# Patient Record
Sex: MALE | Race: WHITE
[De-identification: names, ages, dates, MRNs, and addresses within clinical notes are randomized per-mention and may not be internally consistent; named-entity substitution may affect disease eponyms.]

---

## 2020-09-23 ENCOUNTER — HOSPITAL ENCOUNTER (OUTPATIENT)
Dept: HOSPITAL 92 - LABBT | Age: 80
Discharge: HOME | End: 2020-09-23
Attending: UROLOGY
Payer: MEDICARE

## 2020-09-23 DIAGNOSIS — Z20.828: ICD-10-CM

## 2020-09-23 DIAGNOSIS — C67.9: ICD-10-CM

## 2020-09-23 DIAGNOSIS — Z01.812: Primary | ICD-10-CM

## 2020-09-23 LAB
ANION GAP SERPL CALC-SCNC: 14 MMOL/L (ref 10–20)
APTT PPP: 27 SEC (ref 22.9–36.1)
BUN SERPL-MCNC: 21 MG/DL (ref 8.4–25.7)
CALCIUM SERPL-MCNC: 9.1 MG/DL (ref 7.8–10.44)
CHLORIDE SERPL-SCNC: 105 MMOL/L (ref 98–107)
CO2 SERPL-SCNC: 23 MMOL/L (ref 23–31)
CREAT CL PREDICTED SERPL C-G-VRATE: 0 ML/MIN (ref 70–130)
GLUCOSE SERPL-MCNC: 97 MG/DL (ref 83–110)
HGB BLD-MCNC: 14.3 G/DL (ref 14–18)
INR PPP: 0.9
MCH RBC QN AUTO: 32.4 PG (ref 27–31)
MCV RBC AUTO: 97.3 FL (ref 78–98)
PLATELET # BLD AUTO: 171 THOU/UL (ref 130–400)
POTASSIUM SERPL-SCNC: 4.5 MMOL/L (ref 3.5–5.1)
PROTHROMBIN TIME: 12.6 SEC (ref 12–14.7)
RBC # BLD AUTO: 4.41 MILL/UL (ref 4.7–6.1)
SODIUM SERPL-SCNC: 137 MMOL/L (ref 136–145)
WBC # BLD AUTO: 5.9 THOU/UL (ref 4.8–10.8)

## 2020-09-23 PROCEDURE — 87635 SARS-COV-2 COVID-19 AMP PRB: CPT

## 2020-09-23 PROCEDURE — U0003 INFECTIOUS AGENT DETECTION BY NUCLEIC ACID (DNA OR RNA); SEVERE ACUTE RESPIRATORY SYNDROME CORONAVIRUS 2 (SARS-COV-2) (CORONAVIRUS DISEASE [COVID-19]), AMPLIFIED PROBE TECHNIQUE, MAKING USE OF HIGH THROUGHPUT TECHNOLOGIES AS DESCRIBED BY CMS-2020-01-R: HCPCS

## 2020-09-23 PROCEDURE — 85730 THROMBOPLASTIN TIME PARTIAL: CPT

## 2020-09-23 PROCEDURE — 85027 COMPLETE CBC AUTOMATED: CPT

## 2020-09-23 PROCEDURE — 85610 PROTHROMBIN TIME: CPT

## 2020-09-23 PROCEDURE — 80048 BASIC METABOLIC PNL TOTAL CA: CPT

## 2020-09-28 ENCOUNTER — HOSPITAL ENCOUNTER (OUTPATIENT)
Dept: HOSPITAL 92 - SDC | Age: 80
Discharge: HOME | End: 2020-09-28
Attending: UROLOGY
Payer: MEDICARE

## 2020-09-28 VITALS — BODY MASS INDEX: 24.3 KG/M2

## 2020-09-28 DIAGNOSIS — D41.4: Primary | ICD-10-CM

## 2020-09-28 DIAGNOSIS — Z88.5: ICD-10-CM

## 2020-09-28 DIAGNOSIS — I10: ICD-10-CM

## 2020-09-28 DIAGNOSIS — Z79.899: ICD-10-CM

## 2020-09-28 DIAGNOSIS — Z91.041: ICD-10-CM

## 2020-09-28 PROCEDURE — 88313 SPECIAL STAINS GROUP 2: CPT

## 2020-09-28 PROCEDURE — 88341 IMHCHEM/IMCYTCHM EA ADD ANTB: CPT

## 2020-09-28 PROCEDURE — 88342 IMHCHEM/IMCYTCHM 1ST ANTB: CPT

## 2020-09-28 PROCEDURE — 88307 TISSUE EXAM BY PATHOLOGIST: CPT

## 2020-09-28 PROCEDURE — 0TBB8ZX EXCISION OF BLADDER, VIA NATURAL OR ARTIFICIAL OPENING ENDOSCOPIC, DIAGNOSTIC: ICD-10-PCS | Performed by: UROLOGY

## 2020-09-28 PROCEDURE — 74420 UROGRAPHY RTRGR +-KUB: CPT

## 2020-09-28 PROCEDURE — BT14ZZZ FLUOROSCOPY OF KIDNEYS, URETERS AND BLADDER: ICD-10-PCS | Performed by: UROLOGY

## 2020-09-28 NOTE — OP
DATE OF PROCEDURE:  09/28/2020



PREOPERATIVE DIAGNOSIS:  Bladder tumors.



POSTOPERATIVE DIAGNOSIS:  Bladder tumors.



PROCEDURES PERFORMED:  

1. Cystoscopy.

2. Bilateral retrograde.

3. Transurethral resection of bladder tumors.



SPECIMENS REMOVED:  Bladder tumors.



ESTIMATED BLOOD LOSS:  Minimal.



FINDINGS:  The floor well away from the trigone.  There was 1.5 cm patch of small

papillary tumors group next to each other.  They were taken together in 2 swipes.

Retrograde studies were done that were normal. 



DESCRIPTION OF PROCEDURE:  After obtaining written and verbal consent from the

patient, he was taken to the operating suite.  He was placed in supine position on

the treatment table.  PlexiPulses were placed in his lower extremities and turned

on.  He had received some IV antibiotics as well as some IV Solu-Cortef.  He was

given a general anesthetic and oral obturator intubation and he was placed in the

dorsal lithotomy position, sterilely prepped and draped.  Cystoscopy was performed

with a 22-[place] sheath.  This was well lubricated, passed under direct vision

through the male urethra into the urinary bladder with aid of a 30-degree lens and

video camera and monitor.  The bladder was filled and emptied a number of times as

it was examined with 30 and 70 degree lens.  Using a 30-degree lens, we brought in a

cone-tipped catheter, flushed with contrast.   KUB was taken and we did a right

retrograde followed by left retrograde.  This was done by injecting about 12 mL of

contrast in a retrograde manner through the cone-tipped catheter up the right

ureter, filling out the ureter and the collecting system.  The same on the left

side.  Drainage films were done.  At this point, the Gyrus instruments were brought

in with [employer] resectoscope and the Gyrus generator.  A 30-degree lens and a

visual obturator were used to pass the resectoscope sheath into the bladder and then

the Gyrus was hooked up.  We went ahead and resected the small patch of tumors.

They were all just very looked like little early papillary tumors.  We cauterized

the edges in the base of these tumors.  This was sent off with 2 swipes.  The

bladder was drained.  The instruments were removed.  Sweet catheter was sterilely

inserted and hooked up to a leg bag.  He at this point was awakened and extubated

and taken by pollo to recovery room. 







Job ID:  [id number]

## 2020-09-28 NOTE — RAD
EXAM: Retrograde IVP



HISTORY: Bladder tumor



COMPARISON: None



FINDINGS/IMPRESSION: Limited intraoperative fluoroscopic views of the retrograde IVP were submitted f
or interpretation. There is no evidence of hydronephrosis. No obvious filling defects are seen.



Reported By: Prabhakar Peñaloza 

Electronically Signed:  9/28/2020 9:44 AM

## 2020-10-01 ENCOUNTER — HOSPITAL ENCOUNTER (EMERGENCY)
Dept: HOSPITAL 92 - ERS | Age: 80
Discharge: HOME | End: 2020-10-01
Payer: MEDICARE

## 2020-10-01 DIAGNOSIS — I10: ICD-10-CM

## 2020-10-01 DIAGNOSIS — R10.9: Primary | ICD-10-CM

## 2020-10-01 DIAGNOSIS — Z79.899: ICD-10-CM

## 2020-10-01 LAB
ALBUMIN SERPL BCG-MCNC: 4.6 G/DL (ref 3.4–4.8)
ALP SERPL-CCNC: 54 U/L (ref 40–110)
ALT SERPL W P-5'-P-CCNC: 26 U/L (ref 8–55)
ANION GAP SERPL CALC-SCNC: 18 MMOL/L (ref 10–20)
AST SERPL-CCNC: 28 U/L (ref 5–34)
BACTERIA UR QL AUTO: (no result) HPF
BASOPHILS # BLD AUTO: 0 THOU/UL (ref 0–0.2)
BASOPHILS NFR BLD AUTO: 0 % (ref 0–1)
BILIRUB SERPL-MCNC: 0.9 MG/DL (ref 0.2–1.2)
BUN SERPL-MCNC: 20 MG/DL (ref 8.4–25.7)
CALCIUM SERPL-MCNC: 9.9 MG/DL (ref 7.8–10.44)
CHLORIDE SERPL-SCNC: 101 MMOL/L (ref 98–107)
CO2 SERPL-SCNC: 24 MMOL/L (ref 23–31)
CREAT CL PREDICTED SERPL C-G-VRATE: 0 ML/MIN (ref 70–130)
EOSINOPHIL # BLD AUTO: 0 THOU/UL (ref 0–0.7)
EOSINOPHIL NFR BLD AUTO: 0.2 % (ref 0–10)
GLOBULIN SER CALC-MCNC: 3.2 G/DL (ref 2.4–3.5)
GLUCOSE SERPL-MCNC: 138 MG/DL (ref 83–110)
HGB BLD-MCNC: 16.2 G/DL (ref 14–18)
LEUKOCYTE ESTERASE UR QL STRIP.AUTO: 500 LEU/UL
LYMPHOCYTES # BLD: 0.7 THOU/UL (ref 1.2–3.4)
LYMPHOCYTES NFR BLD AUTO: 5 % (ref 21–51)
MCH RBC QN AUTO: 32.3 PG (ref 27–31)
MCV RBC AUTO: 96.4 FL (ref 78–98)
MONOCYTES # BLD AUTO: 0.6 THOU/UL (ref 0.11–0.59)
MONOCYTES NFR BLD AUTO: 4.5 % (ref 0–10)
NEUTROPHILS # BLD AUTO: 12.3 THOU/UL (ref 1.4–6.5)
NEUTROPHILS NFR BLD AUTO: 90.3 % (ref 42–75)
PLATELET # BLD AUTO: 188 THOU/UL (ref 130–400)
POTASSIUM SERPL-SCNC: 4.5 MMOL/L (ref 3.5–5.1)
PROT UR STRIP.AUTO-MCNC: 30 MG/DL
RBC # BLD AUTO: 5.02 MILL/UL (ref 4.7–6.1)
SODIUM SERPL-SCNC: 138 MMOL/L (ref 136–145)
SP GR UR STRIP: 1.02 (ref 1–1.04)
WBC # BLD AUTO: 13.6 THOU/UL (ref 4.8–10.8)
WBC UR QL AUTO: (no result) HPF (ref 0–3)

## 2020-10-01 PROCEDURE — 81015 MICROSCOPIC EXAM OF URINE: CPT

## 2020-10-01 PROCEDURE — 87077 CULTURE AEROBIC IDENTIFY: CPT

## 2020-10-01 PROCEDURE — S0028 INJECTION, FAMOTIDINE, 20 MG: HCPCS

## 2020-10-01 PROCEDURE — 96374 THER/PROPH/DIAG INJ IV PUSH: CPT

## 2020-10-01 PROCEDURE — 81003 URINALYSIS AUTO W/O SCOPE: CPT

## 2020-10-01 PROCEDURE — 74178 CT ABD&PLV WO CNTR FLWD CNTR: CPT

## 2020-10-01 PROCEDURE — 96361 HYDRATE IV INFUSION ADD-ON: CPT

## 2020-10-01 PROCEDURE — 87186 SC STD MICRODIL/AGAR DIL: CPT

## 2020-10-01 PROCEDURE — 80053 COMPREHEN METABOLIC PANEL: CPT

## 2020-10-01 PROCEDURE — 87086 URINE CULTURE/COLONY COUNT: CPT

## 2020-10-01 PROCEDURE — 96375 TX/PRO/DX INJ NEW DRUG ADDON: CPT

## 2020-10-01 PROCEDURE — 85025 COMPLETE CBC W/AUTO DIFF WBC: CPT

## 2020-10-01 NOTE — CON
DATE OF CONSULTATION:  



HISTORY OF PRESENT ILLNESS:  This is the ER visit.  This patient was seen in my

office this morning with terrible pain after removing his Sweet catheter following a

minor TURBT 3 days ago.  Pain was actually started before the catheter was removed,

sound like it was mainly bladder spasm.  Urine was clear.  Catheter was removed.  He

still has a type of bladder spasm, had one episode of urge incontinence.  He slept

for probably 5 or 6 hours without much trouble, woke up at 3 in the morning full and

had awful pain at that point with urination and pain after the suprapubic region,

the base of the penis.  No fevers.  No chills.  No nausea or vomiting.  No flank

pain.  No blood in the urine.  I saw him in the office.  He had a low postvoid

residual ultrasound, passed a Sweet catheter, and his urine was clear.  I left the

Sweet and it is a 22-N[place]ali with about 10 mL in the balloon.  I could watch him,

hoping that he would get better just with the catheter and he did not, so we sent

him to the ER here.  He had blood work done.  His white count was slightly elevated

at 13.6 and hemoglobin 16.2.  His creatinine was 1.2.  Liver function was normal.

He had a urine culture set up as he did have some hematuria and pyuria, which I

think is all related to his recent bladder surgery and then I think a low count of

some bacteria, maybe 1+ bacteria.  He has been on Macrodantin.  His vital signs here

had been fine, although he did have a pulse up in the 90 range.  He received a B and

O suppository when he got here.  This did not seem to help much.  He gets some

morphine and then was sent for a 3-phase CAT scan.  This was discussed with

Radiology.  He did require a rapid prep for this because of a contrast allergy.  He

did not have any trouble with the CT scan.  The CT scan was reviewed by myself as

well as by the radiologist.  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

He had an upper pole nephrectomy and the ureter only is in the pelvis.  The left

side upper pole moiety goes all the way down through the retroperitoneum into the

pelvis.  This has been seen on prior CTs at the [facility].  I was able to

pull those to compare, but Dr. Close felt there really not significantly changed.

There is nothing to suggest a bladder leak or any free fluid in the pelvis.

Catheter was in good position.  He has been more comfortable since the CAT scan,

although he did receive some Dilaudid here a little while ago as the pain was

starting to return.  He is eating now.  He is much more comfortable and was quite

tender this morning in the office, but now when he relaxed with compression on his

lower abdomen, there is no rebound and no guarding. 



IMPRESSION:  Abdominal pain, which is of an unclear origin.  This would be very

atypical for someone with a minor transurethral resection of bladder tumor to have

this type of pain.  I do not think this is related to a bladder leak.  We did do a

formal cystogram, but he is certain he had nothing to suggest that on the contrast

CT scan.  Where he has done better with the catheter in, we will leave the catheter

in, and sent home with a catheter, and I will take it out postoperative day #7,

which would be this coming Monday.  He has some antibiotics at home, which will stay

on, and the urine culture was set up here.  They are going to give him some

hydrocodone to go home with and he has some tramadol for me or use the tramadol that

does not work.  He will have the hydrocodone.  He needs to have very limited

activity while at home.  He will call my office tomorrow, let me know how he is

doing, make sure he has enough antibiotics to cover him for the weekend and get a

visit set up for Monday. 







Job ID:  [id number]

## 2020-10-01 NOTE — CT
CT OF THE ABDOMEN AND PELVIS WITH AND WITHOUT IV CONTRAST:

 

Date:  10/01/2020

 

INDICATION:

History of recent bladder surgery with a prominent amount of retained urine. Patient is noting pain w
ith urination. 

 

COMPARISON:  

Prior CT abdomen and pelvis without contrast dated 12/21/2017. 

 

FINDINGS:

The lung bases are clear. 

 

The liver, gallbladder, pancreas, adrenal glands, and spleen appear within normal limits. 

 

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 The superior moiety of 
the left renal collecting system remains dilated with marked dilatation of the left renal pelvis of t
he superior pole. There is marked hydroureter bilaterally. The superior right renal collecting system
 and right kidney has been partially resected. The right hydroureter is visualized at the level of th
e mid abdomen and is extending down into the inferior aspect of the bladder bilaterally. Bladder is d
ecompressed largely with a Sweet catheter. No suspicious focal renal lesion is evident. No gross urot
helial lesion is evident. There is some mild urothelial enhancement involving the dilated duplicated 
ureters and left renal pelvis. 

 

There is colonic diverticulosis. Small bowel is normal appearing. 

 

There is stable degenerative change involving the visualized thoracolumbar spine. 

 

IMPRESSION: 

1.  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
y of the left kidney. The hydroureter on the right extends up to the mid abdomen. The proximal aspect
 of the superior moiety of the right kidney has been partially resected. There is enhancement of the 
urothelium of the duplicated renal collecting systems bilaterally which may reflect a superimposed ur
eteritis. 

 

2.  Sweet catheter in a decompressed bladder. 

 

3.  No definite solid renal lesion demonstrated. 

 

4.  Colonic diverticulosis. 

 

5.  Other chronic findings as above. 

 

 

POS: TERESA

## 2020-10-16 ENCOUNTER — HOSPITAL ENCOUNTER (EMERGENCY)
Dept: HOSPITAL 92 - ERS | Age: 80
Discharge: LEFT BEFORE DISCHARGE | End: 2020-10-16
Payer: MEDICARE

## 2020-10-16 ENCOUNTER — HOSPITAL ENCOUNTER (OUTPATIENT)
Dept: HOSPITAL 92 - SPEC | Age: 80
End: 2020-10-16
Attending: INTERNAL MEDICINE
Payer: MEDICARE

## 2020-10-16 DIAGNOSIS — Z53.21: Primary | ICD-10-CM

## 2020-10-16 DIAGNOSIS — N12: ICD-10-CM

## 2020-10-16 DIAGNOSIS — Z91.041: ICD-10-CM

## 2020-10-16 DIAGNOSIS — N39.0: Primary | ICD-10-CM

## 2020-10-16 PROCEDURE — 36569 INSJ PICC 5 YR+ W/O IMAGING: CPT

## 2020-10-16 PROCEDURE — 02HV33Z INSERTION OF INFUSION DEVICE INTO SUPERIOR VENA CAVA, PERCUTANEOUS APPROACH: ICD-10-PCS | Performed by: RADIOLOGY

## 2020-10-16 PROCEDURE — C1751 CATH, INF, PER/CENT/MIDLINE: HCPCS

## 2020-10-16 NOTE — SPC
PICC PLACEMENT

ULTRASOUND-GUIDED VENOUS ACCESS:

(Peripherally inserted central catheter)



DATE:

10/16/2020



HISTORY:

79 year old male with urinary tract infection requiring long-term IV antibiotics



TECHNIQUE:

Catheter caliber: 5  [place]

Catheter trim length:39 cm

Catheter lumen number:single

Catheter tip location:superior vena cava

Vein accessed:left basilic-brachial combination variant.



Total fluoroscopy time: 0.5 min.

Dose area product: 1936 mGy*cm^2



Signed, informed consent was obtained. A tourniquet was applied at the proximal aspect of the arm. Th
e arm was prepped and draped in the usual sterile fashion. A 25-gauge needle was used to applied

buffered lidocaine superficially. The vein was punctured with a 21-gauge micropuncture needle under u
ltrasound guidance. A 0.018 inch guidewire was advanced through the micropuncture needle and into

the vein. Under fluoroscopic guidance, the guidewire was advanced to the superior vena cava. The PICC
 was flushed and trimmed to the appropriate length. The micropuncture needle was exchanged over the

guidewire for a 5 [place] peel-away dilator sheath. The dilator was exchanged over the guidewire for t
he PICC, which was then further advanced under fluoroscopy. The sheath and guidewire were removed.

The PICC was flushed again and secured in place at the arm after adjustment of tip position. The brittney
ent tolerated the procedure well. There was no complication.



IMPRESSION:

Successful placement of PICC (peripherally inserted central catheter).



Reported By: Fahad Gardner 

Electronically Signed:  10/16/2020 10:17 AM